# Patient Record
Sex: FEMALE | Race: OTHER | NOT HISPANIC OR LATINO
[De-identification: names, ages, dates, MRNs, and addresses within clinical notes are randomized per-mention and may not be internally consistent; named-entity substitution may affect disease eponyms.]

---
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
## 2024-02-07 NOTE — ED PROVIDER NOTE - PMH
S/p Temporal lobectomy in 2007  Continue home Lamictal and Topamax   No pertinent past medical history

## 2024-06-04 NOTE — BH INPATIENT PSYCHIATRY PROGRESS NOTE - NSICDXBHPRIMARYDX_PSY_ALL_CORE
[facility] EMERGENCY DEPT  EMERGENCY DEPARTMENT ENCOUNTER       Pt Name: Carlin Ibrahim  MRN: [id number]  Birthdate 1988  Date of evaluation: 6/4/2024  Provider: Natasha Perez PA-C   PCP: No primary care provider on file.  Note Started: 1:40 PM EDT 6/4/24     CHIEF COMPLAINT       Chief Complaint   Patient presents with    Flank Pain     Patient ambulatory into triage complaining of bilateral flank pain x3 days with urinary discomfort and frequency. Patient denies blood in the urine. Patient reports vomiting the first day this started but hasn't thrown up since then.         HISTORY OF PRESENT ILLNESS: 1 or more elements      History From: Patient  HPI Limitations: None     Carlin Ibrahim is a 35 y.o. female who presents complaining of bilateral flank pain, urinary frequency, dark-colored urine x 3 days.  Patient reports that symptoms started with urinary frequency, and she additionally had bilateral low back pain that radiated to the front.  She notes that the first day her symptoms started she had nausea and vomiting, reports that this resolved and she has had not had vomiting within the last 2 days.  Patient denies fevers.  She denies vaginal discharge, vaginal irritation, vaginal odor.  She denies vaginal bleeding.  Reports last ultrasound was approximately 3 weeks ago.  She denies any heavy lifting, traumatic injury recent fall.  She denies any radiation of pain to her extremity.  She denies any extremity numbness, extremity weakness, extremity tingling, saddle anesthesia, bowel/bladder incontinence, IV drug use.     Nursing Notes were all reviewed and agreed with or any disagreements were addressed in the HPI.     REVIEW OF SYSTEMS      Review of Systems     Positives and Pertinent negatives as per HPI.    PAST HISTORY     Past Medical History:  No past medical history on file.    Past Surgical History:  No past surgical history on file.    Family History:  No family history on file.    Social 
Bipolar disorder   F31.9  

## 2024-06-23 ENCOUNTER — NON-APPOINTMENT (OUTPATIENT)
Age: 19
End: 2024-06-23

## 2025-04-01 ENCOUNTER — APPOINTMENT (OUTPATIENT)
Dept: ORTHOPEDIC SURGERY | Facility: CLINIC | Age: 20
End: 2025-04-01

## 2025-05-23 NOTE — BH PSYCHOLOGY - CLINICIAN PSYCHOTHERAPY NOTE - NSBHPSYCHOLNARRATIVE_PSY_A_CORE FT
no This was a family session conducted via telehealth with pt and writer present on [address] and pt's mother present via Zoom videoconference due to COVID19 visiting restrictions.  Writer oriented pt and mother to purpose of family meeting. Both pt and mother were curious to hearing anticipated timeline for discharge. Writer informed pt and mother that if pt can engage in safety planning, commit to safety and treatment outside the hospital, and treatment team gets confirmation from outpatient therapist that pt can resume Dialectical Behavior Therapy, pt could be discharged on Wednesday. Dr. Goltz joined family meeting to answer mother's medication questions. Securing the environment was discussed. Mother agreed to secure all medications and continue to secure sharps including razors and scissors. Mother committed to increasing pt's supervision and monitoring following discharge. Though she initially struggled to identify warning signs, with prompting from writer and mother was able to identify warning signs and she and her mother agreed to use 0-10 rating scale on severity of warning signs in the context of safety. Pt identified coping strategies as well as people to contact for distraction and support. She stated that she was still wary of Dialectical Behavior Therapy but felt motivated to remain outside the hospital and recommitted to engaging in treatment. Pt and her mother were reminded to utilize phone coaching, and/or call 911 or return to the emergency room in event of a crisis. Pt and her mother were in agreement with safety plan. This was a family session conducted via telehealth with pt and writer present on [address] and pt's mother present via Zoom videoconference due to COVID19 visiting restrictions.  Writer oriented pt and mother to purpose of family meeting. Both pt and mother were curious to hearing anticipated timeline for discharge. Writer informed pt and mother that if pt can engage in safety planning, commit to safety and treatment outside the hospital, and treatment team gets confirmation from outpatient therapist that pt can resume Dialectical Behavior Therapy, pt could be discharged on Wednesday. Dr. Goltz joined family meeting to answer mother's medication questions. Securing the environment was discussed. Mother agreed to secure all medications and continue to secure sharps including razors and scissors. Mother committed to increasing pt's supervision and monitoring following discharge. Mother confirmed that there are no firearms or weapons in the home. Though she initially struggled to identify warning signs, with prompting from writer and mother was able to identify warning signs and she and her mother agreed to use 0-10 rating scale on severity of warning signs in the context of safety. Pt identified coping strategies as well as people to contact for distraction and support. She stated that she was still wary of Dialectical Behavior Therapy but felt motivated to remain outside the hospital and recommitted to engaging in treatment. Pt and her mother were reminded to utilize phone coaching, and/or call 911 or return to the emergency room in event of a crisis. Pt and her mother were in agreement with safety plan.

## 2025-06-12 NOTE — ASU DISCHARGE PLAN (ADULT/PEDIATRIC). - ACTIVITY LEVEL
no sports/gym/Nonweightbearing left leg in brace at all times/no heavy lifting/no exercise/no weight bearing
present
